# Patient Record
Sex: MALE | Race: WHITE | URBAN - METROPOLITAN AREA
[De-identification: names, ages, dates, MRNs, and addresses within clinical notes are randomized per-mention and may not be internally consistent; named-entity substitution may affect disease eponyms.]

---

## 2021-01-20 ENCOUNTER — OFFICE VISIT (OUTPATIENT)
Dept: URBAN - METROPOLITAN AREA CLINIC 22 | Facility: CLINIC | Age: 66
End: 2021-01-20
Payer: COMMERCIAL

## 2021-01-20 DIAGNOSIS — H43.811 VITREOUS DEGENERATION, RIGHT EYE: Primary | ICD-10-CM

## 2021-01-20 DIAGNOSIS — H35.3190 NONEXUDATIVE AGE-RELATED MACULAR DEGENERATION: ICD-10-CM

## 2021-01-20 DIAGNOSIS — H25.013 CORTICAL AGE-RELATED CATARACT, BILATERAL: ICD-10-CM

## 2021-01-20 PROCEDURE — 92004 COMPRE OPH EXAM NEW PT 1/>: CPT | Performed by: OPTOMETRIST

## 2021-01-20 ASSESSMENT — INTRAOCULAR PRESSURE
OS: 18
OD: 16

## 2021-01-20 NOTE — IMPRESSION/PLAN
Impression: Cortical age-related cataract, bilateral: H25.013.  Bilateral. Plan: referred for sx as necessary

## 2021-01-20 NOTE — IMPRESSION/PLAN
Impression: Nonexudative age-related macular degeneration: H35.3190.  Bilateral. Plan: Rec'd Blue blocking lenses, [facility] or Preservision as directed, no smoking, MAC OCT as needed, Monitor

## 2021-01-20 NOTE — IMPRESSION/PLAN
Impression: Vitreous degeneration, right eye: H43.811. Right.  Plan: signs and symptoms of RD explained, RTC immediately